# Patient Record
Sex: FEMALE | Race: WHITE | NOT HISPANIC OR LATINO | Employment: FULL TIME | URBAN - METROPOLITAN AREA
[De-identification: names, ages, dates, MRNs, and addresses within clinical notes are randomized per-mention and may not be internally consistent; named-entity substitution may affect disease eponyms.]

---

## 2017-03-06 ENCOUNTER — TELEPHONE (OUTPATIENT)
Dept: NEUROSURGERY | Facility: CLINIC | Age: 27
End: 2017-03-06

## 2017-03-06 NOTE — TELEPHONE ENCOUNTER
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  Needs CT Head (-)     Please send inbasket to Eevlyn or Elizabeth for scheduling.    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     Pt can be reached at [phone]

## 2017-03-06 NOTE — TELEPHONE ENCOUNTER
Order was already in chart. Please schedule (her recall is not due until July - please do not schedule it before then).

## 2017-03-14 ENCOUNTER — TELEPHONE (OUTPATIENT)
Dept: NEUROSURGERY | Facility: CLINIC | Age: 27
End: 2017-03-14

## 2017-03-14 NOTE — TELEPHONE ENCOUNTER
----- Message from Nuria Arredondo sent at 3/13/2017  3:49 PM EDT -----  Regarding: removing tumors  Contact: [phone]   Pt is a 1 yr recall pt.due in the last of July. She was seen for fibrous dysplasia of bone. Pt says that she and Dr Tang talked about removing her other tumors in her nose and pt would like to see about doing that this summer since she is a . In reading the notes it says schedule her ct for spring break but not to schedule  Office  Visit til July.

## 2017-03-14 NOTE — TELEPHONE ENCOUNTER
"S/p resection of fibrous dysplasia frontal bones (L) lateral orbitotomy with bone flap excision and optic nerve decompression 4-7-16     CT of the head done July 21 of 2016: This demonstrates a substantial reduction in the intraocular burden of fibrous dysplastic tissue. The frontal bone fibrous dysplasia has essentially been resected. The remains area on the medial aspect of the left orbit of significant hyperplastic bony tissue. The optic canal is widely patent\".    At last visit on 7-26-16  \"patient is under care of oculoplastic surgery and is undergoing a series of eye balancing operative interventions. I will be in contact with Dr. Abdi with regard to the possibility of resection of the medial orbital fibrous dysplastic tissue to see whether or not she thinks that might improve the patient's overall visual status. The goal at this time is to give her binocular vision in the neutral position.  "

## 2017-03-15 NOTE — TELEPHONE ENCOUNTER
MARISELAM asking patient to call. We can go ahead and schedule her FU (recall) w/CT scan - she will need to see JE as he may want to discuss surgery options with her.     Also need to find out when the last time she saw her ophthalmologist (Dr. Abdi - last note we have is July 2016). Need any additional notes if she has seen Dr. Abdi since then. Evelyn, if she calls back, you can go ahead and handle all of this. Thanks.

## 2017-04-07 ENCOUNTER — HOSPITAL ENCOUNTER (OUTPATIENT)
Dept: CT IMAGING | Facility: HOSPITAL | Age: 27
Discharge: HOME | End: 2017-04-07
Attending: NEUROLOGICAL SURGERY | Admitting: NEUROLOGICAL SURGERY

## 2017-04-07 DIAGNOSIS — M85.00 FIBROUS DYSPLASIA OF BONE: Chronic | ICD-10-CM

## 2017-04-07 PROCEDURE — 77011 CT SCAN FOR LOCALIZATION: CPT

## 2017-05-09 ENCOUNTER — OFFICE VISIT (OUTPATIENT)
Dept: NEUROSURGERY | Facility: CLINIC | Age: 27
End: 2017-05-09

## 2017-05-09 VITALS
HEIGHT: 66 IN | BODY MASS INDEX: 30.05 KG/M2 | HEART RATE: 68 BPM | DIASTOLIC BLOOD PRESSURE: 66 MMHG | WEIGHT: 187 LBS | SYSTOLIC BLOOD PRESSURE: 124 MMHG

## 2017-05-09 DIAGNOSIS — M85.00 FIBROUS DYSPLASIA OF BONE: Primary | Chronic | ICD-10-CM

## 2017-05-09 DIAGNOSIS — H57.12 LEFT EYE PAIN: ICD-10-CM

## 2017-05-09 PROCEDURE — 99213 OFFICE O/P EST LOW 20 MIN: CPT | Performed by: NEUROLOGICAL SURGERY

## 2018-03-13 ENCOUNTER — TELEPHONE (OUTPATIENT)
Dept: NEUROSURGERY | Facility: CLINIC | Age: 28
End: 2018-03-13

## 2018-03-13 NOTE — TELEPHONE ENCOUNTER
Patient had surgery on 4/07/16 By Dr Tang for craniotomy Patient last seen on 5/9/17 by Dr Tang.     Patient wants to know if Dr Tang will see her on a yearly basis to make sure that her tumors are not growing. Last office note does not say anything about needing to be seen again. Pt was calling to schedule her yearly check up.    Please advise    Haley [phone]

## 2018-03-13 NOTE — TELEPHONE ENCOUNTER
At her last office visit in May 2017 w CHAPARRITA, she was pending evaluation at [facility] with regards to treatment options for worsening fibrous dysplastic changes of the L orbit. Did she go to [place]? And, if so, were there any procedcures performed or new imaging studies completed? We have none of those records.

## 2018-03-13 NOTE — TELEPHONE ENCOUNTER
LVM w/ patient to call back. We will need these questions answered prior to scheduling.     1.Was patient evaluated at the [facility]?  2.Where any procedures performed?   3.Has any new imaging been obtained?     If the patient calls back, one of the MA's can get the answers and send back to APRN for the plan.

## 2018-07-11 ENCOUNTER — TELEPHONE (OUTPATIENT)
Dept: NEUROSURGERY | Facility: CLINIC | Age: 28
End: 2018-07-11

## 2018-07-11 NOTE — TELEPHONE ENCOUNTER
I'm not sure that we need to follow her. I wonder if she would be better suited to follow with ophthalmology. Please ask DENG

## 2018-07-11 NOTE — TELEPHONE ENCOUNTER
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  (prior conversation in March noted). She did go to [place] for evaluation 7-25-17 and was seen by neurosurgery (Dr. Melendez) and ophthalmology (reports in Care Everywhere). They did not recommend surgery.     She had follow-up appt w/opth locally and underwent (L) orbitotomy, release of inferior rectus muscle on 12-1-17 (also in Care Everywhere).     She only wants observation; not surgery. Knowing this, what type imaging will she need prior to recall appt w/APRN on 7/26/18. Last imaging was stereotactic CT head 4/7/17

## 2018-07-13 NOTE — TELEPHONE ENCOUNTER
PER DENG: she would be best served by following with her ophthalmologist. Patient informed. FU appt on 7-26 cancelled.

## 2018-07-13 NOTE — TELEPHONE ENCOUNTER
Patient is calling because she said that she has not heard anything about her CT Scan being scheduled.   Please advise  Haley  [phone]

## 2020-01-07 ENCOUNTER — OFFICE VISIT (OUTPATIENT)
Dept: SLEEP MEDICINE | Facility: HOSPITAL | Age: 30
End: 2020-01-07

## 2020-01-07 ENCOUNTER — LAB (OUTPATIENT)
Dept: LAB | Facility: HOSPITAL | Age: 30
End: 2020-01-07

## 2020-01-07 VITALS
OXYGEN SATURATION: 98 % | BODY MASS INDEX: 30.37 KG/M2 | HEART RATE: 68 BPM | WEIGHT: 189 LBS | HEIGHT: 66 IN | DIASTOLIC BLOOD PRESSURE: 55 MMHG | SYSTOLIC BLOOD PRESSURE: 118 MMHG

## 2020-01-07 DIAGNOSIS — G47.10 HYPERSOMNIA: ICD-10-CM

## 2020-01-07 DIAGNOSIS — G47.10 HYPERSOMNIA: Primary | ICD-10-CM

## 2020-01-07 LAB
25(OH)D3 SERPL-MCNC: 32.7 NG/ML (ref 30–100)
ALBUMIN SERPL-MCNC: 4.5 G/DL (ref 3.5–5.2)
ALBUMIN/GLOB SERPL: 1.7 G/DL
ALP SERPL-CCNC: 55 U/L (ref 39–117)
ALT SERPL W P-5'-P-CCNC: 14 U/L (ref 1–33)
ANION GAP SERPL CALCULATED.3IONS-SCNC: 10.8 MMOL/L (ref 5–15)
AST SERPL-CCNC: 19 U/L (ref 1–32)
BASOPHILS # BLD AUTO: 0.03 10*3/MM3 (ref 0–0.2)
BASOPHILS NFR BLD AUTO: 0.6 % (ref 0–1.5)
BILIRUB SERPL-MCNC: 0.3 MG/DL (ref 0.2–1.2)
BUN BLD-MCNC: 16 MG/DL (ref 6–20)
BUN/CREAT SERPL: 18.8 (ref 7–25)
CALCIUM SPEC-SCNC: 9.3 MG/DL (ref 8.6–10.5)
CHLORIDE SERPL-SCNC: 101 MMOL/L (ref 98–107)
CO2 SERPL-SCNC: 25.2 MMOL/L (ref 22–29)
CREAT BLD-MCNC: 0.85 MG/DL (ref 0.57–1)
DEPRECATED RDW RBC AUTO: 42.4 FL (ref 37–54)
EOSINOPHIL # BLD AUTO: 0.03 10*3/MM3 (ref 0–0.4)
EOSINOPHIL NFR BLD AUTO: 0.6 % (ref 0.3–6.2)
ERYTHROCYTE [DISTWIDTH] IN BLOOD BY AUTOMATED COUNT: 12.6 % (ref 12.3–15.4)
GFR SERPL CREATININE-BSD FRML MDRD: 80 ML/MIN/1.73
GLOBULIN UR ELPH-MCNC: 2.6 GM/DL
GLUCOSE BLD-MCNC: 90 MG/DL (ref 65–99)
HCT VFR BLD AUTO: 36.5 % (ref 34–46.6)
HGB BLD-MCNC: 12.2 G/DL (ref 12–15.9)
IMM GRANULOCYTES # BLD AUTO: 0.01 10*3/MM3 (ref 0–0.05)
IMM GRANULOCYTES NFR BLD AUTO: 0.2 % (ref 0–0.5)
LYMPHOCYTES # BLD AUTO: 2.02 10*3/MM3 (ref 0.7–3.1)
LYMPHOCYTES NFR BLD AUTO: 39.5 % (ref 19.6–45.3)
MCH RBC QN AUTO: 30.8 PG (ref 26.6–33)
MCHC RBC AUTO-ENTMCNC: 33.4 G/DL (ref 31.5–35.7)
MCV RBC AUTO: 92.2 FL (ref 79–97)
MONOCYTES # BLD AUTO: 0.42 10*3/MM3 (ref 0.1–0.9)
MONOCYTES NFR BLD AUTO: 8.2 % (ref 5–12)
NEUTROPHILS # BLD AUTO: 2.6 10*3/MM3 (ref 1.7–7)
NEUTROPHILS NFR BLD AUTO: 50.9 % (ref 42.7–76)
NRBC BLD AUTO-RTO: 0 /100 WBC (ref 0–0.2)
PLATELET # BLD AUTO: 205 10*3/MM3 (ref 140–450)
PMV BLD AUTO: 11 FL (ref 6–12)
POTASSIUM BLD-SCNC: 4.3 MMOL/L (ref 3.5–5.2)
PROT SERPL-MCNC: 7.1 G/DL (ref 6–8.5)
RBC # BLD AUTO: 3.96 10*6/MM3 (ref 3.77–5.28)
SODIUM BLD-SCNC: 137 MMOL/L (ref 136–145)
TSH SERPL DL<=0.05 MIU/L-ACNC: 1.56 UIU/ML (ref 0.27–4.2)
VIT B12 BLD-MCNC: 520 PG/ML (ref 211–946)
WBC NRBC COR # BLD: 5.11 10*3/MM3 (ref 3.4–10.8)

## 2020-01-07 PROCEDURE — 99244 OFF/OP CNSLTJ NEW/EST MOD 40: CPT | Performed by: PSYCHIATRY & NEUROLOGY

## 2020-01-07 PROCEDURE — 80053 COMPREHEN METABOLIC PANEL: CPT | Performed by: PSYCHIATRY & NEUROLOGY

## 2020-01-07 PROCEDURE — 82607 VITAMIN B-12: CPT | Performed by: PSYCHIATRY & NEUROLOGY

## 2020-01-07 PROCEDURE — 82306 VITAMIN D 25 HYDROXY: CPT | Performed by: PSYCHIATRY & NEUROLOGY

## 2020-01-07 PROCEDURE — 36415 COLL VENOUS BLD VENIPUNCTURE: CPT | Performed by: PSYCHIATRY & NEUROLOGY

## 2020-01-07 PROCEDURE — 84443 ASSAY THYROID STIM HORMONE: CPT | Performed by: PSYCHIATRY & NEUROLOGY

## 2020-01-07 PROCEDURE — G0463 HOSPITAL OUTPT CLINIC VISIT: HCPCS

## 2020-01-07 PROCEDURE — 85025 COMPLETE CBC W/AUTO DIFF WBC: CPT | Performed by: PSYCHIATRY & NEUROLOGY

## 2020-01-07 NOTE — PROGRESS NOTES
Subjective   Haley Taylor is a 29 y.o. female.     CC:  Excessive daytime sleepiness    Ms. Taylor is a 29-year-old female who is seen in consultation at the request of Jose Mcintyre for the evaluation of hypersomnia.  I reviewed the patient's records.  I reviewed the referring providers note from October 7, 2019.  Reports the patient wanted a referral to see a sleep specialist.  She keeps falling asleep while driving.  This is been a problem most of her life.  This occurs regardless of how much sleep she gets the night before.  There is no snoring.  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  She has a droopy left eyelid.  Referral to sleep medicine was made.  I reviewed the patient's labs.  In 2016 showed an elevated glucose of 159 on her BMP and her CBC showed a white blood cell count of 21.84 and anemia with a hemoglobin of 8.4.  The patient had a head CT in 2016 that showed a bifrontal craniectomy with mesh with surgical resection of the fibrous dysplasia involving the frontal bones extending into the left orbital roof, left cribriform plate, and fovea ethmoidalis.  There is been successful surgical resection of a cystic collection extending from the left ethmoid sinus into the floor the left anterior cranial fossa and into the posterior medial left orbit.  There is some scar and granulation tissue along the defect in the medial left orbital wall and along deep to the mesh is a rind of fluid and postoperative scar and granulation tissue.  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  There are still areas of fibrous dysplasia involving the cribriform plate, remaining fovea ethmoidalis, the bony walls of the left sigmoid sinus and the left superior and middle turbinates.  She had a head CT done at the [facility] that shows findings of calvarium and facial fibrous  dysplasia with mild lateral displacement of the left medial rectus muscle.  Soft tissue within the superior lateral extraconal left orbit near the operative margin.    Reports EDS when driving.  ESS is a 16.  Symptoms started in September, but mom reports that she has always slept for a really long time.  Would sleep up to 12 hours as a teenager.  No associated symptoms.  The patient goes to bed between 1030 and 11 and gets up at 6 AM on the weekdays and 8:30 AM on the weekends.  When she wakes up she like to go back to sleep immediately.  She does not nap.  If she naps she will sleep for hours and I will make her feel worse.  Stays asleep ok.  Not refreshed in the am.  No intentional naps.  Rare accidental naps.  2 cups of coffee a day.  1 can of soda a day.  Regular exercise.  Maybe one drink per week.  Watches TV before bed.  No med changes when symptoms started.   started working night shift.  Dad snores loudly and falls asleep a lot.  Has never had a sleep study.  Follows with ENT. Some automatic behaviors.  No blood work in the past year.      Apnea:  Gasping- no  Witnessed pauses- no  Night sweats- no  AM HA- Rare  Snoring- sometimes  FHx- no one dx  Dry mouth- only if she is sick    RLS:  Urge/sensation to move limbs in evening- sometimes  Improved with movement- for a little bit  Worse at night- yes  Worse when stationary (ie on plane/car)- yes    RBD:  Vivid dreaming- no  Acting out dreams- no    Parasomnia:Sleep walking or eating? no    Narcolepsy:  Sleep paralysis- no  Hallucinations- no  Cataplexy- no  Are short naps refreshing? no    ROS:  Coughing- no  Pain- no  GERD- no  Nasal congestion/stuffiness- better with flonase  Nocturia- once per night    Describes mood as: generally good  Describes memory as: good    Prior PSG? no           The following portions of the patient's history were reviewed and updated as appropriate: allergies, current medications, past family history, past medical  history, past social history, past surgical history and problem list.    Review of Systems   Constitutional: Negative.    HENT: Positive for postnasal drip. Negative for nosebleeds.    Respiratory: Negative.    Cardiovascular: Negative.    Gastrointestinal: Negative.    Endocrine: Negative.    Genitourinary: Negative.    Musculoskeletal: Negative.    Skin: Negative.    Neurological: Negative.    Hematological: Negative.    Psychiatric/Behavioral: Negative.        Objective   Physical Exam   Constitutional:  Vital signs reviewed.  No apparent distress.  Well groomed.  Eyes:  No injection, no icterus.  Fundoscopic exam performed.  No papilledema appreciated bilaterally.   Respiratory:  Normal effort.  Clear to auscultation bilaterally.  Cardiovascular:  Regular rate and rhythm.  No murmurs.  No carotid bruits. Symmetric radial pulses.  Musculoskeletal: Normal station.  Gait steady.  Normal arm swing.  Patient able to walk on heels and toes.  Tandem gait intact.  Romberg negative.  Muscle tone and bulk normal in the bilateral upper and lower extremities.  Strength is 5/5 in the bilateral upper and lower extremities proximally and distally unless otherwise specified in the neurological exam.  Skin:  No rashes.  Warm, dry, and intact.  Psychiatric:  Good mood.  Normal affect.    Neurologic:  Mental status-  The patient is alert and oriented to person, place and time. Attention/concentration is within normal limits.  Speech is fluent without dysarthria.  The patient is able to name, repeat and follow complex commands without difficulty.  Immediate memory and delayed recall intact (3/3 words immediate and after 4 minutes).  Fund of knowledge normal.  Cranial nerves- Pupils equally round and reactive to light with intact accomodation.  Visual fields intact.  The left eye does not abduct or elevate.  Facial sensation intact.  Smile symmetric.  Hearing intact to finger-rub bilaterally.  Palate elevates symmetrically.  SCM and  trapezius are 5/5 bilaterally.  Tongue is midline.  Motor-  See musculoskeletal above.  No tremor.  Reflexes- 2+ in the bilateral biceps, brachioradialis, patellar and achilles.  Toes down-going bilaterally.  Sensation- Intact to pinprick and vibration in bilateral upper and lower extremities symmetrically.  Coordination- Intact to finger to nose and heel knee shin bilaterally.   Gait- See musculoskeletal exam above.       Assessment/Plan   Problems Addressed this Visit     None      Visit Diagnoses     Hypersomnia    -  Primary    Relevant Orders    CBC & Differential    Comprehensive Metabolic Panel    TSH Rfx On Abnormal To Free T4    Vitamin B12    Vitamin D 25 Hydroxy    Polysomnography 4 or more parameters    Urine Drug Screen - Urine, Clean Catch    Multiple sleep latency test without CPAP        The patient is a 29-year-old female with a history of fibrous dysplasia of her cranial bone status post surgery 4 years ago who presents to the neurology clinic today for excessive daytime sleepiness.    1.  Idiopathic hypersomnia-The patient's clinical presentation seems to fit best with that diagnosis.  She has not had blood work in the past year and therefore I would like to check for secondary causes of hypersomnia.  I have a low clinical suspicion for sleep disordered breathing.  I would like to do an overnight sleep study with the next day CHRISTIANO MCGOWAN.  We briefly discussed the diagnosis as well as treatment options.  If she does indeed have hypersomnia, she may be a candidate for wake promoting medications like modafinil.

## 2020-03-26 ENCOUNTER — APPOINTMENT (OUTPATIENT)
Dept: SLEEP MEDICINE | Facility: HOSPITAL | Age: 30
End: 2020-03-26

## 2020-03-27 ENCOUNTER — APPOINTMENT (OUTPATIENT)
Dept: SLEEP MEDICINE | Facility: HOSPITAL | Age: 30
End: 2020-03-27

## 2020-05-31 ENCOUNTER — HOSPITAL ENCOUNTER (OUTPATIENT)
Dept: SLEEP MEDICINE | Facility: HOSPITAL | Age: 30
Discharge: HOME | End: 2020-05-31
Admitting: PSYCHIATRY & NEUROLOGY

## 2020-05-31 DIAGNOSIS — G47.10 HYPERSOMNIA: ICD-10-CM

## 2020-05-31 PROCEDURE — 95810 POLYSOM 6/> YRS 4/> PARAM: CPT

## 2020-05-31 PROCEDURE — 95810 POLYSOM 6/> YRS 4/> PARAM: CPT | Performed by: PSYCHIATRY & NEUROLOGY

## 2020-06-01 ENCOUNTER — HOSPITAL ENCOUNTER (OUTPATIENT)
Dept: SLEEP MEDICINE | Facility: HOSPITAL | Age: 30
Discharge: HOME | End: 2020-06-01
Admitting: PSYCHIATRY & NEUROLOGY

## 2020-06-01 DIAGNOSIS — G47.10 HYPERSOMNIA: ICD-10-CM

## 2020-06-01 LAB
AMPHET+METHAMPHET UR QL: NEGATIVE
BARBITURATES UR QL SCN: NEGATIVE
BENZODIAZ UR QL SCN: NEGATIVE
CANNABINOIDS SERPL QL: NEGATIVE
COCAINE UR QL: NEGATIVE
METHADONE UR QL SCN: NEGATIVE
OPIATES UR QL: NEGATIVE
OXYCODONE UR QL SCN: NEGATIVE

## 2020-06-01 PROCEDURE — 80307 DRUG TEST PRSMV CHEM ANLYZR: CPT | Performed by: PSYCHIATRY & NEUROLOGY

## 2020-06-01 PROCEDURE — 95805 MULTIPLE SLEEP LATENCY TEST: CPT

## 2020-08-02 PROCEDURE — 95805 MULTIPLE SLEEP LATENCY TEST: CPT | Performed by: PSYCHIATRY & NEUROLOGY

## 2020-08-04 ENCOUNTER — OFFICE VISIT (OUTPATIENT)
Dept: SLEEP MEDICINE | Facility: HOSPITAL | Age: 30
End: 2020-08-04

## 2020-08-04 VITALS
HEART RATE: 68 BPM | HEIGHT: 66 IN | WEIGHT: 201 LBS | SYSTOLIC BLOOD PRESSURE: 122 MMHG | OXYGEN SATURATION: 98 % | BODY MASS INDEX: 32.3 KG/M2 | DIASTOLIC BLOOD PRESSURE: 59 MMHG

## 2020-08-04 DIAGNOSIS — G47.10 HYPERSOMNIA: Primary | ICD-10-CM

## 2020-08-04 PROCEDURE — 99214 OFFICE O/P EST MOD 30 MIN: CPT | Performed by: PSYCHIATRY & NEUROLOGY

## 2020-08-04 PROCEDURE — G0463 HOSPITAL OUTPT CLINIC VISIT: HCPCS

## 2020-08-04 RX ORDER — MODAFINIL 100 MG/1
100 TABLET ORAL DAILY
Qty: 30 TABLET | Refills: 5 | Status: SHIPPED | OUTPATIENT
Start: 2020-08-04 | End: 2020-10-28

## 2020-08-07 ENCOUNTER — TELEPHONE (OUTPATIENT)
Dept: NEUROLOGY | Facility: CLINIC | Age: 30
End: 2020-08-07

## 2020-08-07 NOTE — TELEPHONE ENCOUNTER
PATIENT GOT TOLD BY THE PHARMACY THAT THE MEDICATION  modafinil (PROVIGIL) 100 MG tablet NEEDED A P.A - PATIENT CALLED SLEEP LAB THEY STATED THEY DID EVERYTHING THEY COULD AND THIS MESSAGE NEEDED TO BE DIRECTED TO 'S OFFICE.     PLEASE ADVISE.     Haley Taylor (Self) [phone] (H)

## 2020-08-07 NOTE — TELEPHONE ENCOUNTER
I have resubmitted PA to [facility] via  Covermymeds. No response has been received at this time.

## 2020-08-17 ENCOUNTER — TELEPHONE (OUTPATIENT)
Dept: NEUROLOGY | Facility: CLINIC | Age: 30
End: 2020-08-17

## 2020-08-17 NOTE — TELEPHONE ENCOUNTER
Can you write a letter stating that she has a sleep study confirmed diagnosis of idiopathic hypersomnia and that treatment with modafinil is medically necessary?  Thanks!

## 2020-08-17 NOTE — TELEPHONE ENCOUNTER
Spoke to pt. She said that she had sent a mychart msg to the sleep clinic that she never heard back from. She states that insurance informed her that if the Doctor faxed a letter of necessity with records they would reconsider determination.     Please review and advise.   Thank you

## 2020-08-19 ENCOUNTER — PATIENT MESSAGE (OUTPATIENT)
Dept: NEUROLOGY | Facility: CLINIC | Age: 30
End: 2020-08-19

## 2020-08-19 ENCOUNTER — TELEPHONE (OUTPATIENT)
Dept: NEUROLOGY | Facility: CLINIC | Age: 30
End: 2020-08-19

## 2020-08-19 DIAGNOSIS — G47.11 IDIOPATHIC HYPERSOMNIA: Primary | ICD-10-CM

## 2020-08-19 RX ORDER — LEVOTHYROXINE SODIUM 0.07 MG/1
75 TABLET ORAL DAILY
Qty: 30 TABLET | Refills: 11 | Status: SHIPPED | OUTPATIENT
Start: 2020-08-19 | End: 2021-03-12 | Stop reason: HOSPADM

## 2020-08-19 NOTE — TELEPHONE ENCOUNTER
From: Kristen Long MA  To: Haley Taylor  Sent: 8/19/2020 1:28 PM EDT  Subject: Medication Options    Good afternoon,    We have received the denial from your insurance for the modafinil medication, even with the appeal we sent in. Dr. Martínez said that there were a couple of other medications that were discussed as possibilities at your appointment. Those include a low dose thyroid medication or a traditional stimulant (something that would be used for ADHD) like ritalin. What is your preference between the two? If you have further questions it may be easier to reply back through [employer] so that Dr. Martínez can address the conversation directly. My chart messages go directly to Dr. Martínez.    Thank you,  AMRITA Cole

## 2020-08-19 NOTE — TELEPHONE ENCOUNTER
We can try that low dose thyroid medication or a traditional stimulant (something that would be used for ADHD) like ritalin.  We discussed these at her visit.  What dose she prefer?    It may be easier if she sent me [employer] messages directly.

## 2020-08-19 NOTE — TELEPHONE ENCOUNTER
THE PT CALLED IN STATING THAT SHE WOULD LIKE TO TALK WITH SOMEONE FOR HER NEXT OPTION  BECAUSE HER MEDICATION HAS BEEN DENIED   MODAFINIL   BY HER INSURANCE . HER BEST CALL BACK NUMBER [phone]

## 2020-08-20 RX ORDER — METHYLPHENIDATE HYDROCHLORIDE 5 MG/1
5 TABLET ORAL 2 TIMES DAILY
Qty: 60 TABLET | Refills: 0 | Status: SHIPPED | OUTPATIENT
Start: 2020-08-20 | End: 2020-10-29

## 2020-10-28 ENCOUNTER — TELEPHONE (OUTPATIENT)
Dept: NEUROLOGY | Facility: CLINIC | Age: 30
End: 2020-10-28

## 2020-10-28 ENCOUNTER — PATIENT MESSAGE (OUTPATIENT)
Dept: NEUROLOGY | Facility: CLINIC | Age: 30
End: 2020-10-28

## 2020-10-28 DIAGNOSIS — G47.11 IDIOPATHIC HYPERSOMNIA: Primary | ICD-10-CM

## 2020-10-28 RX ORDER — MODAFINIL 200 MG/1
200 TABLET ORAL DAILY
Qty: 30 TABLET | Refills: 2 | Status: SHIPPED | OUTPATIENT
Start: 2020-10-28 | End: 2020-10-29 | Stop reason: SDUPTHER

## 2020-10-28 NOTE — TELEPHONE ENCOUNTER
From: Haley Taylor  To: Ana Martínez MD  Sent: 10/28/2020 8:22 AM EDT  Subject: Prescription Question    Good morning Dr Martínez,    I’m sorry I had to reschedule my follow up appointment this month. I paused taking medication for the first part of October because I participated in a clinical trial for IH. Recently, I’ve been taking Ritalin in the morning. The bottle says I can take another dose 3-4 hours later, but I usually don’t feel l need it. I was wondering if I could try modafinil this time. I still feel incredibly sleepy in the morning whether I take Ritalin or not. This dosage of Ritalin doesn’t seem to have an effect. I would like to use [facility] and get modafinil from the [facility] if possible. I’d like to compare to Ritalin.    Thank you,    Haley Taylor

## 2020-10-28 NOTE — TELEPHONE ENCOUNTER
PA for modafinil sent to insurance set to plan via cover my meds.  No determination has been received at this time.

## 2020-10-29 DIAGNOSIS — G47.11 IDIOPATHIC HYPERSOMNIA: ICD-10-CM

## 2020-10-29 RX ORDER — MODAFINIL 200 MG/1
200 TABLET ORAL DAILY
Qty: 30 TABLET | Refills: 2 | Status: SHIPPED | OUTPATIENT
Start: 2020-10-29 | End: 2021-01-25 | Stop reason: SDUPTHER

## 2020-10-29 NOTE — TELEPHONE ENCOUNTER
----- Message from Haley Taylor sent at 10/28/2020  5:06 PM EDT -----  Regarding: RE: Prescription Question  Contact: [phone]  Is that true for a hormonal IUD as well? I have Mirena.     Also, will you please send the Modafinil prescription to the [facility] on [address]? I added it as an additional pharmacy in my chart. I know my insurance still won't cover it. The [facility] price at [facility] is still over $300, but at [facility] it's $18. I tried to transfer the prescription myself, but because I have never filled it at [facility] and it's a scheduled drug, they can't transfer it.     The address is: [address] [place] [address] [place] [address]    Thank you!

## 2020-12-11 NOTE — PROGRESS NOTES
Subjective   Haley Taylor is a 30 y.o. female.     CC: Hypersomnia    HPI:  Ms. Taylor is a 30-year-old female without significant past medical history who presents today to the sleep clinic as an established patient for follow-up.  The patient was last seen as a new patient on January 7, 2020.  The patient's main concern was excessive daytime sleepiness particular driving.  Her [place] sleepiness score was a 16.  Due to her presentation I suspected idiopathic hypersomnia.  She had an in lab sleep study on May 31, 2020 that did not show sleep apnea.  Her total sleep time was 6 hours.  The next day she had a multiple sleep latency test that showed a mean sleep latency of 3.8 minutes.  She did not have any sleep onset REM periods and her latency to REM on her overnight study was 98.5 minutes.  The patient reports that her symptoms are about the same.  Her [place] sleepiness score today is a 14.  The patient is not currently family-planning and has a Mirena IUD for contraception.  She presents today to go over her sleep study results and to talk about treatment options.         The following portions of the patient's history were reviewed and updated as appropriate: allergies, current medications, past family history, past medical history, past social history, past surgical history and problem list.    Review of Systems    Objective   Physical Exam      Assessment/Plan   Problems Addressed this Visit     None      Visit Diagnoses     Hypersomnia    -  Primary    Relevant Medications    modafinil (PROVIGIL) 100 MG tablet        The patient is a 30-year-old female without significant past medical history who presents today for follow-up.    1.  Idiopathic hypersomnia-The patient's clinical presentation as well as diagnostic testing confirms this diagnosis.  We discussed the pathophysiology and treatment options.  We could try low-dose Synthroid, traditional wake promoting agents, or activating antidepressants.  We  decided to try low-dose Provigil.  She can take it on a daily basis or as needed.  We can further increase the dose as necessary.  We did discuss the rare potential side effects of Montesinos-Og syndrome.  We will see the patient back in approximately 2 months time or sooner if needed.    A total of 25 minutes of face-to-face time was spent with the patient and greater than 50% that time spent on counseling regarding her diagnosis and medication management.          Family

## 2021-01-25 DIAGNOSIS — G47.11 IDIOPATHIC HYPERSOMNIA: ICD-10-CM

## 2021-01-26 RX ORDER — MODAFINIL 200 MG/1
200 TABLET ORAL DAILY
Qty: 30 TABLET | Refills: 5 | Status: SHIPPED | OUTPATIENT
Start: 2021-01-26 | End: 2021-04-18 | Stop reason: SDUPTHER

## 2021-03-12 ENCOUNTER — OFFICE VISIT (OUTPATIENT)
Dept: NEUROLOGY | Facility: CLINIC | Age: 31
End: 2021-03-12

## 2021-03-12 VITALS
HEART RATE: 88 BPM | SYSTOLIC BLOOD PRESSURE: 132 MMHG | HEIGHT: 66 IN | WEIGHT: 211 LBS | BODY MASS INDEX: 33.91 KG/M2 | DIASTOLIC BLOOD PRESSURE: 98 MMHG | OXYGEN SATURATION: 96 %

## 2021-03-12 DIAGNOSIS — G47.11 IDIOPATHIC HYPERSOMNIA: Primary | ICD-10-CM

## 2021-03-12 PROCEDURE — 99213 OFFICE O/P EST LOW 20 MIN: CPT | Performed by: PSYCHIATRY & NEUROLOGY

## 2021-03-12 NOTE — PROGRESS NOTES
Subjective:     Patient ID: Haley Taylor is a 30 y.o. female.    Ms. Taylor is a 30-year-old female without significant past medical history who presents to the neurology clinic today as an established patient for follow-up for idiopathic hypersomnia.  The patient was last seen on August 4, 2020.  Her initial [place] sleepiness score in January 2020 was 16.  She had in lab sleep study in May 2020 that did not show sleep apnea.  Her total sleep time was 6 hours.  Her mean sleep latency the next day was 3.8 minutes.  At her visit in August her [place] sleepiness score was a 14.  The patient is not family-planning and her Mirena IUD is good for another year.  The patient has been on modafinil.  She is currently on 100 mg in the morning.  She started with 200 mg but had side effects for 4 days and cut it back to 100 mg.  The patient thinks that it is working.  She is not driving as much.  She does start back to work on Monday.  She has a 20 to 25-minute commute.  In October she tried to get involved in a clinical trial but ended up not being able to participate.    ESS:  1. Sitting and reading? 1  2. Watching TV? 1  3. Sitting inactive in a public place? 2  4. As a passenger in a car for an hour without a break? 3  5. Lying down to rest in the afternoon when able? 3  6. Sitting and talking to someone? 0  7. Sitting quietly after lunch without EtOH? 1  8. In a car while stopped for a few minutes in traffic? 1  Total: 12    The following portions of the patient's history were reviewed and updated as appropriate: allergies, current medications, past family history, past medical history, past social history, past surgical history and problem list.    Review of Systems   Respiratory: Negative for cough, chest tightness and shortness of breath.    Cardiovascular: Negative for chest pain, palpitations and leg swelling.        Objective:    Neurologic Exam    Physical Exam    Assessment/Plan:    The patient is a 30-year-old  female with history of idiopathic hypersomnia who presents today for follow-up.    1.  Idiopathic hypersomnia-subjectively the patient reports some improvement in her symptoms.  We also see that objectively with her [place] sleepiness score.  We can continue on modafinil 100 mg daily.  In the future, she continues to have some problems while driving, we could increase her dose to 200 mg once a day or 100 mg twice a day.  We will see the patient back in 3 months time or sooner if needed.    A total of 20 minutes of time was spent on this encounter today.  This included reviewing patient's records, face-to-face time, and documentation.       Problems Addressed this Visit     None      Visit Diagnoses     Idiopathic hypersomnia    -  Primary      Diagnoses       Codes Comments    Idiopathic hypersomnia    -  Primary ICD-10-CM: G47.11  ICD-9-CM: 780.54

## 2021-04-16 ENCOUNTER — BULK ORDERING (OUTPATIENT)
Dept: CASE MANAGEMENT | Facility: OTHER | Age: 31
End: 2021-04-16

## 2021-04-16 DIAGNOSIS — Z23 IMMUNIZATION DUE: ICD-10-CM

## 2021-04-18 DIAGNOSIS — G47.11 IDIOPATHIC HYPERSOMNIA: ICD-10-CM

## 2021-04-19 RX ORDER — MODAFINIL 200 MG/1
200 TABLET ORAL DAILY
Qty: 30 TABLET | Refills: 5 | Status: SHIPPED | OUTPATIENT
Start: 2021-04-19 | End: 2021-06-17 | Stop reason: SDUPTHER

## 2021-06-17 DIAGNOSIS — G47.11 IDIOPATHIC HYPERSOMNIA: ICD-10-CM

## 2021-06-17 RX ORDER — MODAFINIL 200 MG/1
200 TABLET ORAL DAILY
Qty: 30 TABLET | Refills: 5 | Status: SHIPPED | OUTPATIENT
Start: 2021-06-17 | End: 2021-08-20

## 2021-08-19 ENCOUNTER — PATIENT MESSAGE (OUTPATIENT)
Dept: NEUROLOGY | Facility: CLINIC | Age: 31
End: 2021-08-19

## 2021-08-19 DIAGNOSIS — G47.11 IDIOPATHIC HYPERSOMNIA: ICD-10-CM

## 2021-08-19 NOTE — TELEPHONE ENCOUNTER
From: Haley Taylor  To: Ana Martínez MD  Sent: 8/19/2021 8:01 AM EDT  Subject: Prescription Question    Good morning Dr Martínez and team,    At my last visit, we discussed that I could take another 100mg dose (200mg daily) in the afternoon. 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 Can I start taking another dose? If so, is there a specific time I should shoot for? I usually take my first dose when I wake up at about 6:15 and my lunch is at 12:00.     Thank you,    Haley Taylor

## 2021-08-20 RX ORDER — MODAFINIL 100 MG/1
100 TABLET ORAL 2 TIMES DAILY
Qty: 60 TABLET | Refills: 5 | Status: SHIPPED | OUTPATIENT
Start: 2021-08-20 | End: 2021-09-17

## 2021-09-17 ENCOUNTER — OFFICE VISIT (OUTPATIENT)
Dept: NEUROLOGY | Facility: CLINIC | Age: 31
End: 2021-09-17

## 2021-09-17 VITALS
HEART RATE: 74 BPM | WEIGHT: 203.26 LBS | DIASTOLIC BLOOD PRESSURE: 74 MMHG | BODY MASS INDEX: 32.67 KG/M2 | OXYGEN SATURATION: 98 % | SYSTOLIC BLOOD PRESSURE: 118 MMHG | HEIGHT: 66 IN

## 2021-09-17 DIAGNOSIS — G47.11 IDIOPATHIC HYPERSOMNIA: Primary | ICD-10-CM

## 2021-09-17 PROCEDURE — 99214 OFFICE O/P EST MOD 30 MIN: CPT | Performed by: PSYCHIATRY & NEUROLOGY

## 2021-09-17 RX ORDER — BUPROPION HYDROCHLORIDE 150 MG/1
150 TABLET ORAL EVERY MORNING
Qty: 30 TABLET | Refills: 11 | Status: SHIPPED | OUTPATIENT
Start: 2021-09-17 | End: 2022-02-02 | Stop reason: SDUPTHER

## 2021-09-17 RX ORDER — FLUTICASONE PROPIONATE 50 MCG
2 SPRAY, SUSPENSION (ML) NASAL DAILY
COMMUNITY
End: 2022-08-03

## 2021-09-17 RX ORDER — MODAFINIL 200 MG/1
TABLET ORAL
Qty: 30 TABLET | Refills: 5 | Status: SHIPPED | OUTPATIENT
Start: 2021-09-17 | End: 2021-11-19 | Stop reason: SDUPTHER

## 2021-09-17 NOTE — PROGRESS NOTES
Subjective:     Patient ID: Haley Taylor is a 31 y.o. female.    Ms. Taylor 31-year-old female without significant past medical history who presents to the neurology clinic today as an established patient for follow-up for idiopathic hypersomnia.  The patient was last seen on March 12 of 2021.  In January 2020 her [place] sleepiness score was a 16.  In August 2020 it was 14.  In March of this year it was 12.  The patient has an IUD and is not currently family-planning.  She had an in lab sleep study May 2020 that showed a total sleep time of 6 hours.  Her mean sleep latency was 3.8 minutes.  She did not feel that methylphenidate was beneficial.  Therefore we started her on modafinil.  She had side effects when she took 200 mg at once.  About a month ago we changed her dose from 100 mg once daily to 100 mg twice a day.  She takes around 6 and 7 in the morning and then noon.  She thinks it is helping with her afternoon sleepiness.  She tries to get a least 8 hours of sleep and feels better with 10.  She started work about a month ago.  She is an art and .  She had 1 day where she had difficulty driving in the morning.    ESS:  1. Sitting and reading? 1  2. Watching TV? 1  3. Sitting inactive in a public place? 2  4. As a passenger in a car for an hour without a break? 3  5. Lying down to rest in the afternoon when able? 3  6. Sitting and talking to someone? 0  7. Sitting quietly after lunch without EtOH? 1  8. In a car while stopped for a few minutes in traffic? 1  Total: 12    The following portions of the patient's history were reviewed and updated as appropriate: allergies, current medications, past family history, past medical history, past social history, past surgical history and problem list.    Review of Systems     Objective:    Neurologic Exam    Physical Exam    Assessment/Plan:    The patient is a 31-year-old female with a history of idiopathic hypersomnia who presents today for  follow-up.    1.  Idiopathic hypersomnia-the patient is having some minor issues while driving to work in the morning.  Her [place] sleepiness score has been stable since the last visit.  Was cups options of not making any changes today, adding a wake promoting antidepressant or trying as needed flumazenil lozenges.  The patient was interested in trying a wake promoting antidepressant.  We discussed Prozac, Effexor, Wellbutrin.  We decided to try Wellbutrin  mg daily.  I did discuss the increase risk of seizures.  I will see the patient back in 3 months or sooner if needed.    A total of 30 minutes of time was spent on this encounter today.  This includes reviewing patient records, face-to-face time, documentation.       Problems Addressed this Visit     None      Visit Diagnoses     Idiopathic hypersomnia    -  Primary    Relevant Medications    modafinil (PROVIGIL) 200 MG tablet      Diagnoses       Codes Comments    Idiopathic hypersomnia    -  Primary ICD-10-CM: G47.11  ICD-9-CM: 780.54

## 2021-11-19 DIAGNOSIS — G47.11 IDIOPATHIC HYPERSOMNIA: ICD-10-CM

## 2021-11-19 RX ORDER — MODAFINIL 200 MG/1
TABLET ORAL
Qty: 30 TABLET | Refills: 0 | Status: SHIPPED | OUTPATIENT
Start: 2021-11-19 | End: 2021-12-31 | Stop reason: SDUPTHER

## 2021-11-22 ENCOUNTER — TELEPHONE (OUTPATIENT)
Dept: NEUROLOGY | Facility: CLINIC | Age: 31
End: 2021-11-22

## 2021-12-31 DIAGNOSIS — G47.11 IDIOPATHIC HYPERSOMNIA: ICD-10-CM

## 2022-01-03 NOTE — TELEPHONE ENCOUNTER
PT FOLLOWING UP ON THIS. SAYS SKIPPED HER DOSE TODAY AND JUST HAS 1 PILL LEFT FOR TOMORROW. PLEASE ADVISE

## 2022-01-04 RX ORDER — MODAFINIL 200 MG/1
TABLET ORAL
Qty: 30 TABLET | Refills: 5 | Status: SHIPPED | OUTPATIENT
Start: 2022-01-04 | End: 2022-08-03

## 2022-02-02 ENCOUNTER — OFFICE VISIT (OUTPATIENT)
Dept: NEUROLOGY | Facility: CLINIC | Age: 32
End: 2022-02-02

## 2022-02-02 VITALS
DIASTOLIC BLOOD PRESSURE: 74 MMHG | SYSTOLIC BLOOD PRESSURE: 122 MMHG | WEIGHT: 198.2 LBS | OXYGEN SATURATION: 99 % | HEIGHT: 66 IN | BODY MASS INDEX: 31.85 KG/M2 | HEART RATE: 78 BPM

## 2022-02-02 DIAGNOSIS — G47.11 IDIOPATHIC HYPERSOMNIA: Primary | ICD-10-CM

## 2022-02-02 PROCEDURE — 99214 OFFICE O/P EST MOD 30 MIN: CPT | Performed by: PSYCHIATRY & NEUROLOGY

## 2022-02-02 RX ORDER — BUPROPION HYDROCHLORIDE 150 MG/1
300 TABLET ORAL EVERY MORNING
Qty: 60 TABLET | Refills: 11 | Status: SHIPPED | OUTPATIENT
Start: 2022-02-02 | End: 2022-08-03

## 2022-02-02 NOTE — PROGRESS NOTES
Subjective:     Patient ID: Haley Taylor is a 31 y.o. female.    The patient is a 31-year-old female without significant past medical history who presents to the neurology clinic today as an established patient for follow-up for idiopathic hypersomnia. The patient was last seen on September 17, 2021. Her initial [place] sleepiness score was a sixteen. She had an in lab sleep study in May 2020 that showed a total sleep time of 6 hours. Her mean sleep latency was 3.8 minutes. She did not have much benefit from methylphenidate. At her last visit she was on modafinil 100 mg twice a day. Her [place] sleepiness score was twelve at that time. We decided to add Wellbutrin. She is on 150 mg daily. She thinks that it is working. She started noticing a difference around November December. She is more energetic and less exhausted. She denies any side effects and reports the medication is affordable. She likes the combination of Wellbutrin and modafinil. She used to have about 15 to 16 days a month where she felt sleeping in her car and now it is down to one. If she misses her dose in the afternoon she does feel a difference. The patient is not currently family-planning and has an IUD.    ESS:  1. Sitting and reading? 1  2. Watching TV? 1  3. Sitting inactive in a public place? 1  4. As a passenger in a car for an hour without a break? 2  5. Lying down to rest in the afternoon when able? 3  6. Sitting and talking to someone? 0  7. Sitting quietly after lunch without EtOH? 1  8. In a car while stopped for a few minutes in traffic? 1  Total: 10    The following portions of the patient's history were reviewed and updated as appropriate: allergies, current medications, past family history, past medical history, past social history, past surgical history and problem list.    Review of Systems     Objective:    Neurologic Exam    Physical Exam    Assessment/Plan:    The patient is a 31-year-old female without significant past  medical history who presents today for follow-up.    1. Idiopathic hypersomnia-overall the patient has done fairly well on a combination of Wellbutrin and modafinil. Her [place] sleepiness score has trended down from a sixteen to a ten. The patient is interested in trying a higher dose of Wellbutrin which is reasonable. We will increase her to three hundred. If she likes this dose we can then call in a prescription for the 300 mg pills. Otherwise if she has side effects or does not feel different she can go back down to 150 mg. We will see the patient back in 6 months or sooner if needed.    A total of 30 minutes of time was spent on this encounter today. This includes reviewing patient records, face-to-face time, documentation.       Problems Addressed this Visit     None      Visit Diagnoses     Idiopathic hypersomnia    -  Primary      Diagnoses       Codes Comments    Idiopathic hypersomnia    -  Primary ICD-10-CM: G47.11  ICD-9-CM: 780.54

## 2022-08-03 ENCOUNTER — TELEPHONE (OUTPATIENT)
Dept: NEUROLOGY | Facility: CLINIC | Age: 32
End: 2022-08-03

## 2022-08-03 ENCOUNTER — OFFICE VISIT (OUTPATIENT)
Dept: NEUROLOGY | Facility: CLINIC | Age: 32
End: 2022-08-03

## 2022-08-03 VITALS
DIASTOLIC BLOOD PRESSURE: 74 MMHG | BODY MASS INDEX: 31.31 KG/M2 | SYSTOLIC BLOOD PRESSURE: 118 MMHG | OXYGEN SATURATION: 99 % | WEIGHT: 194.8 LBS | HEIGHT: 66 IN | HEART RATE: 79 BPM

## 2022-08-03 DIAGNOSIS — G47.11 IDIOPATHIC HYPERSOMNIA: Primary | ICD-10-CM

## 2022-08-03 PROCEDURE — 99213 OFFICE O/P EST LOW 20 MIN: CPT | Performed by: PSYCHIATRY & NEUROLOGY

## 2022-08-03 RX ORDER — MODAFINIL 100 MG/1
100 TABLET ORAL 2 TIMES DAILY
Qty: 60 TABLET | Refills: 5 | Status: SHIPPED | OUTPATIENT
Start: 2022-08-03 | End: 2023-03-21 | Stop reason: SDUPTHER

## 2022-08-03 RX ORDER — BUPROPION HYDROCHLORIDE 300 MG/1
300 TABLET ORAL EVERY MORNING
Qty: 90 TABLET | Refills: 3 | Status: SHIPPED | OUTPATIENT
Start: 2022-08-03 | End: 2023-08-03

## 2022-08-03 NOTE — PATIENT INSTRUCTIONS
At night, only use the bed and bedroom for sleep and sex only. Do not read, watch TV, eat, or worry in bed.   Lie down only when you are sleepy.   If you are unable to fall asleep, get up and go to another room. Avoid stimulating activities if you do get up.   No clocks (if you tend to look at the clock throughout the night) or TV (or other electronic screens) in the bedroom.   Avoid screens (TV, computer, tablet, cell phone) the hour prior to bedtime and during your sleep period.   Establish a regular bedtime routine and a regular sleep/wake schedule. Keep this schedule 7 days a week.   Do not eat or drink close to bedtime.   Make sure your bedroom is dark, cool, and comfortable.   If you need background noise, use a fan or a white noise machine.   Avoid caffeine (regular coffee, decaf coffee, tea, sodas, chocolate, energy drinks).   Avoid alcohol and nicotine close to bedtime.   Exercise during the day, but not within 3 hours of bedtime.   Avoid naps.   Check if any of your night time medications may cause sleep problems.   If you have heart burn or indigestion at night: avoid eating close to bedtime; elevated your head of bed; avoid spicy foods, tomatoes, citrus, alcohol, caffeine, and mint at night; take your antacid at night; sleep on your left side.   If you have nasal stuffiness or congestion: consider taking an over the counter allergy medicine such as zyrtec, claritin, or allegra at night as well as a nasal spray such as Flonase or Nasacort.   If you gasp for air at night, stop breathing in your sleep, have night sweats, snore, unrefreshing sleep, feel tired during the day, have morning headaches or dry mouth in the morning, you may be at risk for having problems with your breathing at night, likely sleep apnea. You may want to talk to your doctor about these symptoms.   Consider trying melatonin at night. This can be purchased over the counter without a prescription.  I recommend doses between 0.5-3 mg.   Try [employer]NC, [employer], Life Extension (on Amazon) or REM Fresh brands.    Consider the Night [employer] Sleep  ranjana or CBT-I  for your smart device.

## 2022-08-03 NOTE — PROGRESS NOTES
Subjective:     Patient ID: Haley Taylor is a 32 y.o. female.    The patient is a 32-year-old female without significant past medical history who presents to the neurology clinic today as established patient for follow-up for idiopathic hypersomnia.  I last saw her on February 2, 2022.  Her initial [place] sleepiness score was a 16.  She had an in lab sleep study May 2020 that showed a total sleep time of 6 hours.  Her mean sleep latency was 3.8 minutes on the MSLT.  Unfortunately she did not have much benefit with methylphenidate.  When we added modafinil 100 mg twice a day her [place] sleepiness score improved to a 12.  Then we added Wellbutrin 150 mg daily it improved again to a 10.  She used to have about 15 to 16 days a month where she felt sleepy in her car.  That improved to once.  At her last visit we increased her Wellbutrin to 300 mg daily.  She feels more awake when she is driving.  She is more awake in the morning and is easier for her to wake up.  She denies any side effects to her medications and reports that they are affordable.  She is not currently family-planning and has an IUD for contraception.    ESS:  1. Sitting and reading? 1  2. Watching TV? 1  3. Sitting inactive in a public place? 0  4. As a passenger in a car for an hour without a break? 2  5. Lying down to rest in the afternoon when able? 1  6. Sitting and talking to someone? 0  7. Sitting quietly after lunch without EtOH? 0  8. In a car while stopped for a few minutes in traffic? 0  Total: 5    The following portions of the patient's history were reviewed and updated as appropriate: allergies, current medications, past family history, past medical history, past social history, past surgical history and problem list.    Review of Systems     Objective:    Neurological Exam    Physical Exam    Assessment/Plan:    The patient is a 32-year-old female without significant past medical history who presents today for follow-up.    1.   Idiopathic hypersomnia-fortunately the patient is side effect free with a much improved [place] sleepiness score on her current regimen of modafinil and Wellbutrin polytherapy.  I recommend continuing on the current dose with no changes.  We did discuss that the modafinil can decrease the effectiveness of oral contraceptive pills.  I would also be careful about her being on these medications if she were to get pregnant.  I will recommend for her to let us know if she does start to family-planning or has her IUD removed.  I will see the patient back in 6 months or sooner if needed.    A total of 20 minutes of time was spent on this encounter today.  This includes reviewing the patient's records, face-to-face time, documentation.       Problems Addressed this Visit    None     Visit Diagnoses     Idiopathic hypersomnia    -  Primary    Relevant Medications    modafinil (PROVIGIL) 100 MG tablet      Diagnoses       Codes Comments    Idiopathic hypersomnia    -  Primary ICD-10-CM: G47.11  ICD-9-CM: 780.54

## 2023-02-07 ENCOUNTER — OFFICE VISIT (OUTPATIENT)
Dept: NEUROLOGY | Facility: CLINIC | Age: 33
End: 2023-02-07
Payer: COMMERCIAL

## 2023-02-07 VITALS
SYSTOLIC BLOOD PRESSURE: 122 MMHG | WEIGHT: 200.8 LBS | DIASTOLIC BLOOD PRESSURE: 74 MMHG | HEIGHT: 66 IN | OXYGEN SATURATION: 99 % | BODY MASS INDEX: 32.27 KG/M2 | HEART RATE: 74 BPM

## 2023-02-07 DIAGNOSIS — G47.11 IDIOPATHIC HYPERSOMNIA: Primary | ICD-10-CM

## 2023-02-07 PROCEDURE — 99214 OFFICE O/P EST MOD 30 MIN: CPT | Performed by: PSYCHIATRY & NEUROLOGY

## 2023-02-07 NOTE — PATIENT INSTRUCTIONS
At night, only use the bed and bedroom for sleep and sex only. Do not read, watch TV, eat, or worry in bed.   Lie down only when you are sleepy.   If you are unable to fall asleep, get up and go to another room. Avoid stimulating activities if you do get up.   No clocks (if you tend to look at the clock throughout the night) or TV (or other electronic screens) in the bedroom.   Avoid screens (TV, computer, tablet, cell phone) the hour prior to bedtime and during your sleep period.   Establish a regular bedtime routine and a regular sleep/wake schedule. Keep this schedule 7 days a week.   Do not eat or drink close to bedtime.   Make sure your bedroom is dark, cool, and comfortable.   If you need background noise, use a fan or a white noise machine.   Avoid caffeine (regular coffee, decaf coffee, tea, sodas, chocolate, energy drinks).   Avoid alcohol and nicotine close to bedtime.   Exercise during the day, but not within 3 hours of bedtime.   Avoid naps.   Check if any of your night time medications may cause sleep problems.   If you have heart burn or indigestion at night: avoid eating close to bedtime; elevated your head of bed; avoid spicy foods, tomatoes, citrus, alcohol, caffeine, and mint at night; take your antacid at night; sleep on your left side.   If you have nasal stuffiness or congestion: consider taking an over the counter allergy medicine such as zyrtec, claritin, or allegra at night as well as a nasal spray such as Flonase or Nasacort.   If you gasp for air at night, stop breathing in your sleep, have night sweats, snore, unrefreshing sleep, feel tired during the day, have morning headaches or dry mouth in the morning, you may be at risk for having problems with your breathing at night, likely sleep apnea. You may want to talk to your doctor about these symptoms.   Consider trying melatonin at night. This can be purchased over the counter without a prescription.  I recommend doses between 0.5-3 mg.   Try [employer]NC, [facility]S, Life Extension (on Amazon) or REM Fresh brands.    Consider the Night Owl Sleep , [employer], [employer] Sleep Journey or CBT-I  apps for your smart device (cell phone or tablet).    Yes

## 2023-02-07 NOTE — PROGRESS NOTES
Subjective:     Patient ID: Haley Taylor is a 32 y.o. female.    History of Present Illness  Ms. Taylor is a 32-year-old female without significant past medical history who presents to the neurology clinic today as an established patient for follow-up for idiopathic hypersomnia.  I last saw her on August 3, 2022.  Her initial [place] sleepiness score was a 16.  Her in lab sleep study showed a total sleep time of 6 hours with a mean sleep latency of 3.8 minutes.  Unfortunately she did not have much benefit with methylphenidate.  When we added modafinil 100 mg twice a day her [place] improved to 12.  We added Wellbutrin 150 mg daily and improved to a 10.  About a year ago we increased her dose to 300 mg and at her last visit it was a 5.  She usually takes her first morning dose about 6:45 AM.  When she work she will take another dose around noon when she eats lunch.  If she is off a lot of times she will take the second dose.  She still has an IUD and is not currently family-planning.  She is to have a Mirena but it changed to a different one recently.  Overall she feels pretty good but she is having some sleepiness while driving.  It has occurred 5 times since November.  There has not been any major changes.  She does not feel like she is going to fall asleep.  It is not while she [place] the [place].  Her [place] sleep score today increased to an 8.    The following portions of the patient's history were reviewed and updated as appropriate: allergies, current medications, past family history, past medical history, past social history, past surgical history and problem list.    Review of Systems     Objective:    Neurological Exam    Physical Exam    Assessment/Plan:    The patient is a 32-year-old female without significant past medical history who presents today for follow-up.    1.  Idiopathic hypersomnia-unfortunately she is having some subjective increase in sleepiness as well as an increase in her  [place].  We discussed options today.  We decided to possibly increase her morning dose of modafinil from 100 mg to 200 mg.  She is going to try this for a few days to see if she tolerates it.  If so, then we can send in a new prescription.  We discussed cost.  It sounds like using good Rx may only cost her $30-$40 which is less than her insurance.  If she does not like that dose increase in modafinil, then we may want to increase her Wellbutrin up to 450.  In the future we could consider other treatment options like Xywav or Sunosi.  We will see the patient back in 2 to 3 months or sooner if needed.    A total of 30 minutes of time was spent on this encounter today.  This includes reviewing the patient's records, face-to-face time, documentation.       Problems Addressed this Visit    None  Visit Diagnoses     Idiopathic hypersomnia    -  Primary      Diagnoses       Codes Comments    Idiopathic hypersomnia    -  Primary ICD-10-CM: G47.11  ICD-9-CM: 780.54

## 2023-03-21 ENCOUNTER — PATIENT MESSAGE (OUTPATIENT)
Dept: NEUROLOGY | Facility: CLINIC | Age: 33
End: 2023-03-21
Payer: COMMERCIAL

## 2023-03-21 DIAGNOSIS — G47.11 IDIOPATHIC HYPERSOMNIA: ICD-10-CM

## 2023-03-21 RX ORDER — MODAFINIL 100 MG/1
100 TABLET ORAL 2 TIMES DAILY
Qty: 60 TABLET | Refills: 5 | Status: SHIPPED | OUTPATIENT
Start: 2023-03-21 | End: 2024-03-20

## 2023-03-21 NOTE — TELEPHONE ENCOUNTER
From: Haley Taylor  To: Ana Martínez  Sent: 3/21/2023 7:14 AM EDT  Subject: Modafinil Update    Hi Dr Martínez,  As we discussed in my last appt, I tried taking 200mg of modafinil in the am and another 100mg at lunch time. Every time I did that, I ended the day with a bad headache and my body just did not feel good. Since then I went back to just taking 100mg twice daily. If I remember correctly, we said the next option could be to up my dosage of Wellbutrin. If possible, I’d like to try that before my next appointment.   Thank you!

## 2023-05-02 ENCOUNTER — OFFICE VISIT (OUTPATIENT)
Dept: NEUROLOGY | Facility: CLINIC | Age: 33
End: 2023-05-02
Payer: COMMERCIAL

## 2023-05-02 VITALS
WEIGHT: 209.6 LBS | SYSTOLIC BLOOD PRESSURE: 124 MMHG | BODY MASS INDEX: 33.68 KG/M2 | DIASTOLIC BLOOD PRESSURE: 74 MMHG | HEART RATE: 72 BPM | HEIGHT: 66 IN | OXYGEN SATURATION: 98 %

## 2023-05-02 DIAGNOSIS — G47.11 IDIOPATHIC HYPERSOMNIA: Primary | ICD-10-CM

## 2023-05-02 RX ORDER — CEFDINIR 300 MG/1
CAPSULE ORAL
COMMUNITY
Start: 2023-04-25

## 2023-05-02 RX ORDER — PREDNISOLONE ACETATE 10 MG/ML
SUSPENSION/ DROPS OPHTHALMIC
COMMUNITY
Start: 2023-04-26

## 2023-05-02 NOTE — LETTER
May 2, 2023     Haley Taylor    Patient: Haley Taylor   YOB: 1990   Date of Visit: 5/2/2023       Dear Dr. Taylor:    Thank you for referring Haley Taylor to me for evaluation. Below are the relevant portions of my assessment and plan of care.    If you have questions, please do not hesitate to call me. I look forward to following Haley along with you.         Sincerely,        Ana Martínez MD        CC: No Recipients    Ana Martínez MD  05/02/23 0939  Signed  Subjective:     Patient ID: Haley Taylor is a 32 y.o. female.    History of Present Illness  The patient is a 32-year-old female with a history of idiopathic hypersomnia who presents to the neurology clinic today as an established patient for follow-up.  When I first saw her at her [place] sleepiness score was a 16.  Her multiple sleep latency test showed a mean sleep latency of 3.8 minutes.  Unfortunately she did not have much benefit with methylphenidate.  We added modafinil 100 mg twice a day her [place] improved to 12.  We added Wellbutrin 150 mg daily and it improved to a 10.  We increased her Wellbutrin to 300 mg it dropped to a 5.  At her last visit she reported that she had about 5 episodes of sleepiness while driving to work between November and February.  Her [place] increased to an 8.  She tried increasing her morning dose of modafinil but it caused headaches.  Fortunately she has not had any more sleepiness while driving.  She thinks it may be due to the light outside when she is waking up.  She is starting at 9 AM but starting in August she will have to be at work at 740.  She worries that getting up earlier with the later sunrise may make her sleepy.    ESS:  1. Sitting and reading? 1  2. Watching TV? 1  3. Sitting inactive in a public place? 0  4. As a passenger in a car for an hour without a break? 1  5. Lying down to rest in the afternoon when able? 2  6. Sitting and talking to someone?  0  7. Sitting quietly after lunch without EtOH? 0  8. In a car while stopped for a few minutes in traffic? 1  Total: 6    The following portions of the patient's history were reviewed and updated as appropriate: allergies, current medications, past family history, past medical history, past social history, past surgical history and problem list.    Review of Systems     Objective:    Neurological Exam    Physical Exam    Assessment/Plan:    The patient is a 32-year-old female with history of idiopathic hypersomnia who presents today for follow-up.    1.  Idiopathic hypersomnia-I suspect that she had issues between November and February because of decreased sunlight during the day.  Since she has not had any further episodes of sleepiness while driving to work, I recommend continuing on her current regimen.  Before she starts the new schedule in early August I recommend for her to do a couple trial runs.  If she does feel sleepy going to work then we can increase her Wellbutrin up to 450 mg.  If she does not do then we will not make any medication changes.  I will see her back a few weeks after school starts to see how she is doing.    A total of 40 minutes of time was spent on this encounter today.  This includes reviewing the patient's records, face-to-face time, and documentation.      Problems Addressed this Visit    None  Visit Diagnoses     Idiopathic hypersomnia    -  Primary      Diagnoses       Codes Comments    Idiopathic hypersomnia    -  Primary ICD-10-CM: G47.11  ICD-9-CM: 780.54

## 2023-05-02 NOTE — PROGRESS NOTES
Subjective:     Patient ID: Haley Taylor is a 32 y.o. female.    History of Present Illness  The patient is a 32-year-old female with a history of idiopathic hypersomnia who presents to the neurology clinic today as an established patient for follow-up.  When I first saw her at her [place] sleepiness score was a 16.  Her multiple sleep latency test showed a mean sleep latency of 3.8 minutes.  Unfortunately she did not have much benefit with methylphenidate.  We added modafinil 100 mg twice a day her [place] improved to 12.  We added Wellbutrin 150 mg daily and it improved to a 10.  We increased her Wellbutrin to 300 mg it dropped to a 5.  At her last visit she reported that she had about 5 episodes of sleepiness while driving to work between November and February.  Her [place] increased to an 8.  She tried increasing her morning dose of modafinil but it caused headaches.  Fortunately she has not had any more sleepiness while driving.  She thinks it may be due to the light outside when she is waking up.  She is starting at 9 AM but starting in August she will have to be at work at 740.  She worries that getting up earlier with the later sunrise may make her sleepy.    ESS:  1. Sitting and reading? 1  2. Watching TV? 1  3. Sitting inactive in a public place? 0  4. As a passenger in a car for an hour without a break? 1  5. Lying down to rest in the afternoon when able? 2  6. Sitting and talking to someone? 0  7. Sitting quietly after lunch without EtOH? 0  8. In a car while stopped for a few minutes in traffic? 1  Total: 6    The following portions of the patient's history were reviewed and updated as appropriate: allergies, current medications, past family history, past medical history, past social history, past surgical history and problem list.    Review of Systems     Objective:    Neurological Exam    Physical Exam    Assessment/Plan:    The patient is a 32-year-old female with history of idiopathic hypersomnia  who presents today for follow-up.    1.  Idiopathic hypersomnia-I suspect that she had issues between November and February because of decreased sunlight during the day.  Since she has not had any further episodes of sleepiness while driving to work, I recommend continuing on her current regimen.  Before she starts the new schedule in early August I recommend for her to do a couple trial runs.  If she does feel sleepy going to work then we can increase her Wellbutrin up to 450 mg.  If she does not do then we will not make any medication changes.  I will see her back a few weeks after school starts to see how she is doing.    A total of 40 minutes of time was spent on this encounter today.  This includes reviewing the patient's records, face-to-face time, and documentation.       Problems Addressed this Visit    None  Visit Diagnoses     Idiopathic hypersomnia    -  Primary      Diagnoses       Codes Comments    Idiopathic hypersomnia    -  Primary ICD-10-CM: G47.11  ICD-9-CM: 780.54

## 2023-07-27 ENCOUNTER — TELEPHONE (OUTPATIENT)
Dept: NEUROLOGY | Facility: CLINIC | Age: 33
End: 2023-07-27
Payer: COMMERCIAL

## 2023-07-27 RX ORDER — BUPROPION HYDROCHLORIDE 300 MG/1
300 TABLET ORAL EVERY MORNING
Qty: 90 TABLET | Refills: 3 | Status: SHIPPED | OUTPATIENT
Start: 2023-07-27 | End: 2024-07-26

## 2023-08-22 ENCOUNTER — OFFICE VISIT (OUTPATIENT)
Dept: NEUROLOGY | Facility: CLINIC | Age: 33
End: 2023-08-22
Payer: COMMERCIAL

## 2023-08-22 VITALS
SYSTOLIC BLOOD PRESSURE: 122 MMHG | HEART RATE: 67 BPM | HEIGHT: 66 IN | WEIGHT: 208 LBS | DIASTOLIC BLOOD PRESSURE: 88 MMHG | OXYGEN SATURATION: 98 % | BODY MASS INDEX: 33.43 KG/M2

## 2023-08-22 DIAGNOSIS — G47.11 IDIOPATHIC HYPERSOMNIA: Primary | ICD-10-CM

## 2023-08-22 PROCEDURE — 99214 OFFICE O/P EST MOD 30 MIN: CPT | Performed by: PSYCHIATRY & NEUROLOGY

## 2023-08-22 NOTE — PATIENT INSTRUCTIONS
At night, only use the bed and bedroom for sleep and sex only. Do not read, watch TV, eat, or worry in bed.   Lie down only when you are sleepy.   If you are unable to fall asleep, get up and go to another room. Avoid stimulating activities if you do get up.   No clocks (if you tend to look at the clock throughout the night) or TV (or other electronic screens) in the bedroom.   Avoid screens (TV, computer, tablet, cell phone) the hour prior to bedtime and during your sleep period.   Establish a regular bedtime routine and a regular sleep/wake schedule. Keep this schedule 7 days a week.   Do not eat or drink close to bedtime.   Make sure your bedroom is dark, cool, and comfortable.   If you need background noise, use a fan or a white noise machine.   Avoid caffeine (regular coffee, decaf coffee, tea, sodas, chocolate, energy drinks).   Avoid alcohol and nicotine close to bedtime.   Exercise during the day, but not within 3 hours of bedtime.   Avoid naps.   Check if any of your night time medications may cause sleep problems.   If you have heart burn or indigestion at night: avoid eating close to bedtime; elevated your head of bed; avoid spicy foods, tomatoes, citrus, alcohol, caffeine, and mint at night; take your antacid at night; sleep on your left side.   If you have nasal stuffiness or congestion: consider taking an over the counter allergy medicine such as zyrtec, claritin, or allegra at night as well as a nasal spray such as Flonase or Nasacort.   If you gasp for air at night, stop breathing in your sleep, have night sweats, snore, unrefreshing sleep, feel tired during the day, have morning headaches or dry mouth in the morning, you may be at risk for having problems with your breathing at night, likely sleep apnea. You may want to talk to your doctor about these symptoms.   Consider trying melatonin at night. This can be purchased over the counter without a prescription.  I recommend doses between 0.5-3 mg.   Try [employer], [employer], [employer] (on [employer]) or REM Fresh brands.    Consider the [employer] [facility] Sleep , [employer], [facility] or [facility]  apps for your smart device (cell phone or tablet).

## 2023-08-22 NOTE — PROGRESS NOTES
Subjective:     Patient ID: Haley Taylor is a 33 y.o. female.    History of Present Illness  The patient is a 33-year-old female with history of idiopathic hypersomnia who presents to the neurology clinic today as an established patient for follow-up.  The patient was last seen on May 2, 2023.  When I initially saw her her [place] sleepiness score was a 16.  Her multiple sleep latency test showed a mean sleep latency of 3.8 minutes.  Unfortunately she did not have much benefit with methylphenidate.  We added on modafinil 100 mg twice a day and her Brandon improved to 12.  We added Wellbutrin 150 mg daily and it improved to a 10.  We increased her Wellbutrin to 300 mg and it dropped to a 5.  Because she had 5 episodes of sleepiness while driving to work between November and February we tried to increase her morning dose of modafinil but that caused headaches.  At that time her Brandon increased to an 8.  She thinks she had problems during the winter because it was still dark outside when she was driving to work.  She has to leave home around 7 AM.  At her last visit her Brandon sleepiness score was a 6.  She continues on modafinil 100 mg twice a day and Wellbutrin 300 mg daily.  She has been doing okay so far.  Today her idiopathic hypersomnia severity scale is a 14.  It still light outside while she is driving.  She is only had 3 days with kids at school so far.  The following portions of the patient's history were reviewed and updated as appropriate: allergies, current medications, past family history, past medical history, past social history, past surgical history, and problem list.    Review of Systems     Objective:    Neurological Exam    Physical Exam    Assessment/Plan:    The patient is a 33-year-old female with a history of idiopathic hypersomnia who presents today for follow-up.    1.  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  We are also concerned about the winter and driving when it is dark outside.  If that is the case, we could consider increasing her Wellbutrin up to 450 mg.  We could also consider light box therapy as well.  We will see the patient back in November or sooner if needed.    A total of 30 minutes of time was spent on this encounter today.  This includes reviewing patient's records, face-to-face time, documentation.       Problems Addressed this Visit    None  Visit Diagnoses       Idiopathic hypersomnia    -  Primary          Diagnoses         Codes Comments    Idiopathic hypersomnia    -  Primary ICD-10-CM: G47.11  ICD-9-CM: 780.54

## 2023-10-21 DIAGNOSIS — G47.11 IDIOPATHIC HYPERSOMNIA: ICD-10-CM

## 2023-10-24 RX ORDER — MODAFINIL 100 MG/1
100 TABLET ORAL 2 TIMES DAILY
Qty: 60 TABLET | Refills: 0 | Status: SHIPPED | OUTPATIENT
Start: 2023-10-24

## 2023-12-07 DIAGNOSIS — G47.11 IDIOPATHIC HYPERSOMNIA: ICD-10-CM

## 2023-12-08 RX ORDER — MODAFINIL 100 MG/1
100 TABLET ORAL 2 TIMES DAILY
Qty: 60 TABLET | Refills: 5 | Status: SHIPPED | OUTPATIENT
Start: 2023-12-08

## 2023-12-19 ENCOUNTER — OFFICE VISIT (OUTPATIENT)
Dept: NEUROLOGY | Facility: CLINIC | Age: 33
End: 2023-12-19
Payer: COMMERCIAL

## 2023-12-19 VITALS
DIASTOLIC BLOOD PRESSURE: 72 MMHG | SYSTOLIC BLOOD PRESSURE: 128 MMHG | HEIGHT: 66 IN | OXYGEN SATURATION: 100 % | WEIGHT: 209 LBS | HEART RATE: 78 BPM | BODY MASS INDEX: 33.59 KG/M2

## 2023-12-19 DIAGNOSIS — G47.11 IDIOPATHIC HYPERSOMNIA: Primary | ICD-10-CM

## 2023-12-19 PROCEDURE — 99213 OFFICE O/P EST LOW 20 MIN: CPT | Performed by: PSYCHIATRY & NEUROLOGY

## 2023-12-19 NOTE — PROGRESS NOTES
Subjective:     Patient ID: Haley Taylor is a 33 y.o. female.    History of Present Illness  The patient is a 33-year-old female with a history of idiopathic hypersomnia who presents to the neurology clinic today as an established patient for follow-up.  The patient was last seen on August 22, 2023.  Her multiple sleep latency test showed a mean sleep latency of 3.8 minutes.  She did not have much benefit with methylphenidate.  She is currently on modafinil 100 mg twice a day.  She is also on Wellbutrin 300 mg daily.  At her last visit her IHSS score was a 14.  Today is a 13.  She was concerned about the winter and worsening symptoms.  Fortunately the dark has not been a problem so far.  She did get a light box but has difficulty working and into her routine.  She denies any side effects to her medications.  She uses an IUD for contraception.  She does report that her medications are affordable.    The following portions of the patient's history were reviewed and updated as appropriate: allergies, current medications, past family history, past medical history, past social history, past surgical history, and problem list.    Review of Systems     Objective:    Neurological Exam    Physical Exam    Assessment/Plan:    The patient is a 33-year-old female with history of idiopathic hypersomnia who presents today for follow-up.    1.  Pathic hypersomnia-fortunately her IHSS score has trended down.  She is borderline between mild and moderate symptoms.  She would like to continue on her current regimen with no changes which is reasonable.  In the future her Wellbutrin could be further titrated up if necessary.    A total of 20 minutes of time was spent on this encounter today.  This includes reviewing the patient's records, face-to-face time, documentation.       Problems Addressed this Visit    None  Visit Diagnoses       Idiopathic hypersomnia    -  Primary    Relevant Orders    Ambulatory Referral to Sleep Medicine           Diagnoses         Codes Comments    Idiopathic hypersomnia    -  Primary ICD-10-CM: G47.11  ICD-9-CM: 780.54

## 2024-03-05 ENCOUNTER — OFFICE VISIT (OUTPATIENT)
Dept: SLEEP MEDICINE | Facility: HOSPITAL | Age: 34
End: 2024-03-05
Payer: COMMERCIAL

## 2024-03-05 VITALS
HEART RATE: 68 BPM | HEIGHT: 66 IN | BODY MASS INDEX: 33.59 KG/M2 | OXYGEN SATURATION: 99 % | WEIGHT: 209 LBS | SYSTOLIC BLOOD PRESSURE: 132 MMHG | DIASTOLIC BLOOD PRESSURE: 63 MMHG

## 2024-03-05 DIAGNOSIS — G47.11 IDIOPATHIC HYPERSOMNIA: Primary | ICD-10-CM

## 2024-03-05 PROCEDURE — 99204 OFFICE O/P NEW MOD 45 MIN: CPT | Performed by: PSYCHIATRY & NEUROLOGY

## 2024-03-05 PROCEDURE — G0463 HOSPITAL OUTPT CLINIC VISIT: HCPCS

## 2024-03-05 RX ORDER — BUPROPION HYDROCHLORIDE 300 MG/1
300 TABLET ORAL EVERY MORNING
Qty: 90 TABLET | Refills: 3 | Status: SHIPPED | OUTPATIENT
Start: 2024-03-05 | End: 2025-03-05

## 2024-03-05 RX ORDER — MODAFINIL 100 MG/1
100 TABLET ORAL 2 TIMES DAILY
Qty: 60 TABLET | Refills: 5 | Status: SHIPPED | OUTPATIENT
Start: 2024-03-05

## 2024-03-05 RX ORDER — MODAFINIL 100 MG/1
100 TABLET ORAL 2 TIMES DAILY
Qty: 60 TABLET | Refills: 5 | Status: SHIPPED | OUTPATIENT
Start: 2024-03-05 | End: 2024-03-05

## 2024-03-05 NOTE — PROGRESS NOTES
Reason for Consultation: Idiopathic hypersomnia        Patient Care Team:  Provider, No Known as PCP - Itz Sevilla MD as Consulting Physician (Ophthalmology)  Chandler Sommer MD, MPH as Consulting Physician (Sleep Medicine)      History of present illness:    Thank you for asking me to see your patient.  The patient is a 33 y.o. female who was previously seeing Dr. Martínez for idiopathic hypersomnia based on a polysomnogram with no sleep disordered breathing and a multiple sleep latency test which did not show REM onset sleep periods.  She had been maintained on modafinil 100 mg twice daily along with Wellbutrin 300 mg once a day.  She is also on an IUD with levonorgestrel.  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  No the modafinil goes to [employer] while the Wellbutrin goes to [employer].    Habitual bedtime is 10 PM during the week and she gets up at 5:45 AM on the weekends she goes to bed 11 PM gets up at 7:30 AM she says it takes 2 to 5 minutes to fall asleep and she wakes up rather tired.  She probably gained 20 pounds in the past 5 years she sometimes has a dry mouth and she has creepy crawly sensation in her legs she has bruxism and wears a bite guard she wakes up frequently to urinate and she has always had difficulty being sleepy even as a child per her mother.  She works as a teacher has alcohol 2-3 times per week 1 to 2 cups of coffee or soda and has never used tobacco.  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      [DISCONTINUED] buPROPion XL (Wellbutrin XL) 300 MG 24 hr tablet Take 1 tablet by mouth Every Morning. 90 tablet 3    [DISCONTINUED] modafinil (PROVIGIL) 100 MG tablet Take 1 tablet by mouth twice daily 60 tablet 5     No current facility-administered medications on file prior to visit.         Vital Signs:    Vitals:    03/05/24 0804   BP: 132/63   Pulse: 68   SpO2: 99%   Weight: 94.8 kg (209 lb)   Height: 167.6 cm (66\")        Body mass index is 33.73 kg/m².  Neck Circumference: 14 inches      Physical Exam:    Constitutional:  Well developed 33 y.o. female that appears in no apparent distress.  Awake & oriented times 3.  Normal mood with normal recent and remote memory and normal judgement.  Eyes:  Conjunctivae normal.  Oropharynx: Moist mucous membranes without exudate and Mallampati 3  Neck: Trachea midline  Respiratory: Effort is not labored  Cardiovascular: Radial pulse regular  Musculoskeletal: Gait appears normal, no digital clubbing evident, no pre-tibial edema        Impression:   Encounter Diagnoses   Name Primary?    Idiopathic hypersomnia Yes     Patient's BMI is Body mass index is 33.73 kg/m².    Patient is happy with how she is doing has been on a stable dose of modafinil for a period of time.  Unfortunately idiopathic hypersomnia is not covered for prescriptions like narcolepsy is.    Plan:    I renewed her prescription of Wellbutrin and sent it to [facility] and modafinil and sent it to [facility].  I will plan to see her back in a year    The patient should practice good sleep hygiene measures.      Weight loss might be beneficial in this patient who has a Body mass index is 33.73 kg/m².      Pathophysiology of SAUL described to the patient.  Cardiovascular complications of untreated SAUL also reviewed.      The patient was cautioned about the dangers of drowsy driving.    Merrill Sommer MD  Sleep Medicine  03/05/24  08:16 EST         "

## 2024-09-16 DIAGNOSIS — G47.11 IDIOPATHIC HYPERSOMNIA: ICD-10-CM

## 2024-09-16 RX ORDER — MODAFINIL 100 MG/1
100 TABLET ORAL 2 TIMES DAILY
Qty: 60 TABLET | Refills: 0 | OUTPATIENT
Start: 2024-09-16

## 2024-09-16 RX ORDER — MODAFINIL 100 MG/1
100 TABLET ORAL 2 TIMES DAILY
Qty: 60 TABLET | Refills: 5 | Status: SHIPPED | OUTPATIENT
Start: 2024-09-16 | End: 2024-09-17

## 2024-09-17 DIAGNOSIS — G47.11 IDIOPATHIC HYPERSOMNIA: Primary | ICD-10-CM

## 2024-09-17 RX ORDER — MODAFINIL 200 MG/1
200 TABLET ORAL DAILY
Qty: 30 TABLET | Refills: 2 | Status: SHIPPED | OUTPATIENT
Start: 2024-09-17 | End: 2024-12-16

## 2024-09-19 ENCOUNTER — TELEPHONE (OUTPATIENT)
Dept: SLEEP MEDICINE | Facility: HOSPITAL | Age: 34
End: 2024-09-19
Payer: COMMERCIAL

## 2024-10-16 DIAGNOSIS — G47.11 IDIOPATHIC HYPERSOMNIA: ICD-10-CM

## 2024-10-16 RX ORDER — MODAFINIL 200 MG/1
200 TABLET ORAL DAILY
Qty: 30 TABLET | Refills: 2 | Status: SHIPPED | OUTPATIENT
Start: 2024-10-16 | End: 2024-10-16

## 2024-10-16 RX ORDER — MODAFINIL 200 MG/1
200 TABLET ORAL DAILY
Qty: 14 TABLET | Refills: 0 | Status: SHIPPED | OUTPATIENT
Start: 2024-10-16 | End: 2024-10-30

## 2025-03-06 ENCOUNTER — OFFICE VISIT (OUTPATIENT)
Facility: HOSPITAL | Age: 35
End: 2025-03-06
Payer: COMMERCIAL

## 2025-03-06 VITALS — HEIGHT: 66 IN | BODY MASS INDEX: 35.2 KG/M2 | WEIGHT: 219 LBS | HEART RATE: 76 BPM | OXYGEN SATURATION: 98 %

## 2025-03-06 DIAGNOSIS — H50.22 HYPERTROPIA OF LEFT EYE: Primary | ICD-10-CM

## 2025-03-06 DIAGNOSIS — G47.11 IDIOPATHIC HYPERSOMNIA: ICD-10-CM

## 2025-03-06 PROCEDURE — G0463 HOSPITAL OUTPT CLINIC VISIT: HCPCS

## 2025-03-06 RX ORDER — BUPROPION HYDROCHLORIDE 300 MG/1
300 TABLET ORAL EVERY MORNING
Qty: 90 TABLET | Refills: 3 | Status: SHIPPED | OUTPATIENT
Start: 2025-03-06 | End: 2026-03-06

## 2025-03-06 RX ORDER — MODAFINIL 100 MG/1
100 TABLET ORAL DAILY
Qty: 60 TABLET | Refills: 0 | Status: SHIPPED | OUTPATIENT
Start: 2025-03-06 | End: 2025-05-05

## 2025-03-06 NOTE — PROGRESS NOTES
Follow Up [facility] Note       Primary Care Physician: Provider, No Known    Interval History:   The patient is a 34 y.o. female      History of Present Illness  The patient presents for evaluation of idiopathic hypersomnia.    She continues to manage her idiopathic hypersomnia (IH) with modafinil, which she reports as effective. She has been on a consistent dose of modafinil for an extended period. She is requesting a prescription for 60 tablets of modafinil 100 mg, to be taken twice daily, as she prefers this over splitting the 200 mg tablets. She has not undergone any recent imaging studies related to her IH. Her care was previously managed by Ana Martínez, who referred her to our clinic upon relocation. She has not had imaging for IH but has had imaging for other issues. She has not used all the 200 mg up and just refilled it. She is on modafinil and Wellbutrin.    She maintains an annual schedule for ophthalmological examinations due to her double vision. She has consulted with Dr. Dandre Garsia and Horacio Melendez at [facility] in the past, unrelated to her IH, but rather in connection with complications from fibrous dysplasia. In 2014, she developed a cyst on her forehead, which led to a diagnosis of fibrous dysplasia following biopsies and a visit to [facility]. 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 A craniotomy was performed to remove the cyst, but it caused permanent damage to some of her eye muscles. Over the subsequent 2 years, she underwent multiple strabismus surgeries, culminating in the transposition of one muscle to the top. She now has a titanium mesh plate in her forehead. She attempts to have a CT scan annually to monitor the tumors and ensure they are not growing, although she is currently overdue for one. She has recently established care with a new  "ophthalmologist.    MEDICATIONS  Modafinil, Wellbutrin.             Review of Systems:    A complete review of systems was done and all were negative with the exception of none    Social History:    Social History     Socioeconomic History    Marital status:    Tobacco Use    Smoking status: Never    Smokeless tobacco: Never   Vaping Use    Vaping status: Never Used   Substance and Sexual Activity    Alcohol use: Yes     Comment: rarely    Drug use: No    Sexual activity: Yes     Partners: Male       Allergies:  Hydrocodone-acetaminophen, Amoxicillin, and Penicillins     Medication Review:  Reviewed.      Vital Signs:    Vitals:    03/06/25 1510   Pulse: 76   SpO2: 98%   Weight: 99.3 kg (219 lb)   Height: 167.6 cm (66\")     Body mass index is 35.35 kg/m².  .BMIFOLLOWUP    Physical Exam:    Constitutional:  Well developed 34 y.o. female that appears in no apparent distress.  Awake & oriented times 3.  Normal mood with normal recent and remote memory and normal judgement.  Eyes:  Conjunctivae normal.      Self-administered [place] Sleepiness Scale test results:  7  0-5 Lower normal daytime sleepiness  6-10 Higher normal daytime sleepiness  11-12 Mild, 13-15 Moderate, & 16-24 Severe excessive daytime sleepiness           Impression:     Encounter Diagnoses   Name Primary?    Hypertropia of left eye Yes    Idiopathic hypersomnia          Assessment & Plan  1. Idiopathic hypersomnia.  Her sleep study results were reviewed, revealing no REM onset periods. She is currently on modafinil and Wellbutrin, with the modafinil dose being stable for a while. A prescription for modafinil 100 mg, to be taken twice daily, will be provided. She prefers two 100 mg pills instead of splitting a 200 mg pill. The potential interaction between modafinil and her birth control has been discussed. The prescription will be sent to [facility] on [facility].    2. Fibrous dysplasia.  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 She has a titanium mesh plate in her forehead and tries to get a CT scan once a year to monitor tumor growth. She is currently a little behind on her annual CT scan.    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 Over the subsequent 2 years, she underwent multiple strabismus surgeries, culminating in the transposition of one muscle to the top.         Good sleep hygiene measures should be maintained.  Weight loss would be beneficial in this patient who obesity class II by Body mass index is 35.35 kg/m².      After evaluating the patient and assessing results available, the patient is benefiting from the treatment being provided.       I answered all of the patient's questions.  The patient will call the Sleep Disorder Center for any problems and will follow up 1 year.    Patient or patient representative verbalized consent for the use of Ambient Listening during the visit with  Merrill Sommer MD for chart documentation. 3/6/2025  15:31 RAYMON Sommer MD  Sleep Medicine  03/06/25  15:31 EST

## 2025-06-13 DIAGNOSIS — G47.11 IDIOPATHIC HYPERSOMNIA: ICD-10-CM

## 2025-06-13 RX ORDER — MODAFINIL 100 MG/1
200 TABLET ORAL DAILY
Qty: 60 TABLET | Refills: 1 | Status: SHIPPED | OUTPATIENT
Start: 2025-06-13 | End: 2025-06-16 | Stop reason: SDUPTHER

## 2025-06-13 RX ORDER — MODAFINIL 100 MG/1
100 TABLET ORAL DAILY
Qty: 60 TABLET | Refills: 0 | OUTPATIENT
Start: 2025-06-13 | End: 2025-08-12

## 2025-06-13 RX ORDER — MODAFINIL 100 MG/1
TABLET ORAL
Qty: 60 TABLET | Refills: 0 | OUTPATIENT
Start: 2025-06-13

## 2025-06-16 DIAGNOSIS — G47.11 IDIOPATHIC HYPERSOMNIA: ICD-10-CM

## 2025-06-16 RX ORDER — MODAFINIL 100 MG/1
200 TABLET ORAL DAILY
Qty: 60 TABLET | Refills: 1 | Status: SHIPPED | OUTPATIENT
Start: 2025-06-16 | End: 2025-08-15